# Patient Record
Sex: FEMALE | Race: OTHER | Employment: UNEMPLOYED | URBAN - METROPOLITAN AREA
[De-identification: names, ages, dates, MRNs, and addresses within clinical notes are randomized per-mention and may not be internally consistent; named-entity substitution may affect disease eponyms.]

---

## 2018-12-13 ENCOUNTER — APPOINTMENT (OUTPATIENT)
Dept: GENERAL RADIOLOGY | Age: 43
End: 2018-12-13
Attending: EMERGENCY MEDICINE
Payer: SELF-PAY

## 2018-12-13 ENCOUNTER — HOSPITAL ENCOUNTER (EMERGENCY)
Age: 43
Discharge: HOME | End: 2018-12-13
Attending: EMERGENCY MEDICINE
Payer: SELF-PAY

## 2018-12-13 VITALS
HEART RATE: 62 BPM | SYSTOLIC BLOOD PRESSURE: 119 MMHG | BODY MASS INDEX: 41.66 KG/M2 | OXYGEN SATURATION: 99 % | WEIGHT: 180 LBS | RESPIRATION RATE: 14 BRPM | TEMPERATURE: 97.5 F | HEIGHT: 55 IN | DIASTOLIC BLOOD PRESSURE: 81 MMHG

## 2018-12-13 DIAGNOSIS — M25.511 ACUTE PAIN OF RIGHT SHOULDER: Primary | ICD-10-CM

## 2018-12-13 DIAGNOSIS — R03.0 ELEVATED BLOOD PRESSURE READING: ICD-10-CM

## 2018-12-13 PROCEDURE — A4565 SLINGS: HCPCS

## 2018-12-13 PROCEDURE — 74011250637 HC RX REV CODE- 250/637: Performed by: EMERGENCY MEDICINE

## 2018-12-13 PROCEDURE — 73030 X-RAY EXAM OF SHOULDER: CPT

## 2018-12-13 PROCEDURE — 99283 EMERGENCY DEPT VISIT LOW MDM: CPT

## 2018-12-13 RX ORDER — NAPROXEN 375 MG/1
375 TABLET ORAL 2 TIMES DAILY WITH MEALS
Qty: 14 TAB | Refills: 0 | Status: SHIPPED | OUTPATIENT
Start: 2018-12-13

## 2018-12-13 RX ORDER — NAPROXEN 250 MG/1
375 TABLET ORAL
Status: COMPLETED | OUTPATIENT
Start: 2018-12-13 | End: 2018-12-13

## 2018-12-13 RX ORDER — NAPROXEN 250 MG/1
375 TABLET ORAL 2 TIMES DAILY WITH MEALS
Status: DISCONTINUED | OUTPATIENT
Start: 2018-12-13 | End: 2018-12-13

## 2018-12-13 RX ADMIN — NAPROXEN 375 MG: 250 TABLET ORAL at 12:42

## 2018-12-13 NOTE — ED NOTES
Sling applied to right arm. Patient has received discharge paperwork and instructions. Pt verbalized understanding and has no further questions or concerns at this time.

## 2018-12-13 NOTE — ED PROVIDER NOTES
43 y.o. female with no significant past medical history who presents with chief complaint of shoulder pain. Pt complains of right shoulder pain for 4 days with assocaited numbness after moving furniture in her house. Pt states the pain is rated at 8/10 and is exacerbated with movement. Pt states she has taken ibuprofen and tylenol with no relief. Pt denies fall, SOB, or chest pain. There are no other acute medical concerns at this time. Social hx: Unknown. PCP: No primary care provider on file. Note written by Chely Arevalo. Janey Mann, as dictated by Cori Lancaster MD 11:41 AM                 No past medical history on file. No past surgical history on file. No family history on file. Social History     Socioeconomic History    Marital status: Not on file     Spouse name: Not on file    Number of children: Not on file    Years of education: Not on file    Highest education level: Not on file   Social Needs    Financial resource strain: Not on file    Food insecurity - worry: Not on file    Food insecurity - inability: Not on file    Transportation needs - medical: Not on file   [employer] needs - non-medical: Not on file   Occupational History    Not on file   Tobacco Use    Smoking status: Not on file   Substance and Sexual Activity    Alcohol use: Not on file    Drug use: Not on file    Sexual activity: Not on file   Other Topics Concern    Not on file   Social History Narrative    Not on file         ALLERGIES: Patient has no known allergies. Review of Systems   Constitutional: Negative for fever. Respiratory: Negative for shortness of breath. Cardiovascular: Negative for chest pain. Gastrointestinal: Negative for abdominal pain, diarrhea, nausea and vomiting. Musculoskeletal: Positive for arthralgias and myalgias. Neurological: Positive for numbness. All other systems reviewed and are negative.       Vitals:    12/13/18 1142   BP: (!) 181/91   Pulse: 75   Resp: 14   Temp: 97.5 °F (36.4 °C)   SpO2: 100%   Weight: 81.6 kg (180 lb)   Height: 4' 5\" (1.346 m)            Physical Exam   Constitutional: She is oriented to person, place, and time. She appears well-developed and well-nourished. HENT:   Head: Normocephalic and atraumatic. Eyes: Conjunctivae are normal.   Neck: Normal range of motion. Cardiovascular: Normal rate, regular rhythm and normal heart sounds. Pulmonary/Chest: Effort normal and breath sounds normal.   Abdominal: Soft. Bowel sounds are normal. She exhibits no distension. There is no tenderness. Musculoskeletal: Normal range of motion. She exhibits tenderness. + ttp right AC joint; FROM but pain worse with movement; FROM elbow and wrist- palpable radial pulse; sensation and motor intact    Neurological: She is alert and oriented to person, place, and time. Skin: Skin is warm and dry. Nursing note and vitals reviewed. MDM  Number of Diagnoses or Management Options  Acute pain of right shoulder:   Elevated blood pressure reading:   Diagnosis management comments: Suspect rotator cuff or ac separation but will get xray for fx, nsaids and reassess       Amount and/or Complexity of Data Reviewed  Tests in the radiology section of CPT®: ordered and reviewed  Obtain history from someone other than the patient: yes (family)    Patient Progress  Patient progress: stable         Procedures      Patient's results have been reviewed with them. Patient and/or family have verbally conveyed their understanding and agreement of the patient's signs, symptoms, diagnosis, treatment and prognosis and additionally agree to follow up as recommended or return to the Emergency Room should their condition change prior to follow-up.   Discharge instructions have also been provided to the patient with some educational information regarding their diagnosis as well a list of reasons why they would want to return to the ER prior to their follow-up appointment should their condition change.

## 2018-12-13 NOTE — DISCHARGE INSTRUCTIONS
We hope that we have addressed all of your medical concerns. The examination and treatment you received in the Emergency Department were for an emergent problem and were not intended as complete care. It is important that you follow up with your healthcare provider(s) for ongoing care. If your symptoms worsen or do not improve as expected, and you are unable to reach your usual health care provider(s), you should return to the Emergency Department. Today's healthcare is undergoing tremendous change, and patient satisfaction surveys are one of the many tools to assess the quality of medical care. You may receive a survey from the [employer] regarding your experience in the Emergency Department. I hope that your experience has been completely positive, particularly the medical care that I provided. As such, please participate in the survey; anything less than excellent does not meet my expectations or intentions. Formerly [facility] and [address] [place] [address] participate in nationally recognized quality of care measures. If your blood pressure is greater than 120/80, as reported below, we urge that you seek medical care to address the potential of high blood pressure, commonly known as hypertension. Hypertension can be hereditary or can be caused by certain medical conditions, pain, stress, or \"white coat syndrome. \"       Please make an appointment with your health care provider(s) for follow up of your Emergency Department visit. VITALS:   Patient Vitals for the past 8 hrs:   Temp Pulse Resp BP SpO2   12/13/18 1142 97.5 °F (36.4 °C) 75 14 (!) 181/91 100 %          Thank you for allowing us to provide you with medical care today. We realize that you have many choices for your emergency care needs. Please choose us in the future for any continued health care needs.       Ritu Garner MD    [facility], [address] [facility]. Office: [phone]            No results found for this or any previous visit (from the past 24 hour(s)). Xr Shoulder Rt Ap/lat Min 2 V    Result Date: 12/13/2018  EXAM: XR SHOULDER RT AP/LAT MIN 2 V INDICATION: Right shoulder pain after heavy lifting moving  recently. COMPARISON: None. FINDINGS: Three views of the right shoulder demonstrate no fracture, dislocation or other acute abnormality. Joints are within normal limits. Bone mineralization is within normal limits. IMPRESSION: Normal right shoulder views. Presión arterial elevada: Instrucciones de cuidado - [ Elevated Blood Pressure: Care Instructions ]  Instrucciones de cuidado    La presión arterial es kwesi medida de la fuerza que ejerce la patricia contra las wagner de las arterias. Es normal que la presión arterial suba y baje a lo mick del día. Ray si se mantiene yulissa por un tiempo, usted tiene presión arterial yulissa. Dos números indican plasencia presión arterial. El primer número es la presión sistólica. Muestra qué tan austin presiona la patricia cuando el corazón está bombeando. El gabby número es la presión diastólica. Muestra qué tan austin presiona la [employer] latidos, cuando el corazón está relajado y llenándose de [place]. [place] N[place] arterial ideal para adultos es menos de 120/80 (diga \"120 sobre 80\"). La presión arterial yulissa es de 140/90 o superior. Usted tiene la presión arterial yulissa si el número de [place] es 140 o superior o el número de [facility]jo es 90 o superior, o ambas cosas. La prueba principal para la presión arterial yulissa es simple, rápida e indolora. Para diagnosticar presión arterial yulissa, plasencia médico examinará plasencia presión arterial en momentos diferentes. Después de tomarle la presión, es posible que plasencia médico le pida que se vuelva a victorino la presión en casa. Si se le diagnostica presión arterial yulissa, puede colaborar con plasencia médico para elaborar un plan a mick plazo para manejarla.   [address] es kwesi parte clave de plasnecia tratamiento y seguridad. Asegúrese de hacer y acudir a todas las citas, y llame a plasencia médico si está teniendo problemas. También es kwesi buena idea saber los resultados de los exámenes y mantener kwesi lista de los medicamentos que sobia. ¿Cómo puede cuidarse en el hogar? · No fume. Fumar aumenta plasencia riesgo de ataque cerebral y ataque al corazón. Si necesita ayuda para dejarlo, hable con plasencia médico sobre programas y medicamentos para dejar de fumar. Estos pueden aumentar bebeto probabilidades de dejar de fumar para siempre. · Mantenga un peso saludable. · Trate de limitar la cantidad de sodio que ingiere a menos de 2,300 miligramos (mg) al día. Plasencia médico podría pedirle que trate de ingerir menos de 1,500 mg al día. · Manténgase físicamente activo. Mike al menos 30 minutos de ejercicio la mayoría de los días de la [place]. Caminar es kwesi buena opción. Es posible que también quiera hacer otras actividades, carmelo correr, nadar, [employer], o practicar tenis o deportes de equipo. · No tome alcohol o limite la cantidad que jero. Hable con plasencia médico acerca de si puede victorino alcohol. · Coma abundantes frutas, verduras y productos lácteos bajos en grasa. Consuma menos grasa saturada y total.  · Aprenda a revisarse la presión arterial en plasencia hogar. ¿Cuándo debe pedir ayuda? Llame a plasencia médico ahora mismo o busque atención médica inmediata si:  ? · Plasencia presión arterial es mucho más yulissa de lo normal (carmelo 180/110 o superior). ? · Elisha que la presión arterial yulissa está causando síntomas carmelo:  ¨ Dolor de yunior intenso. [address]. ? Vigile muy de cerca los cambios en plasencia vi, y asegúrese de comunicarse con plasencia médico si:  ? · No mejora carmelo se esperaba. ¿Dónde puede encontrar más información en inglés? Mina perdomo http://rupinder-mee.info/. Carletha Drain [id number] en la búsqueda para aprender más acerca de \"Presión arterial elevada:  Instrucciones de cuidado - [ Elevated Blood Pressure: Care Instructions ]. \"  Revisado: 21 septiembre, 2016  Versión del contenido: 11.4  © 0290-3050 Healthwise, Incorporated. Las instrucciones de cuidado fueron adaptadas bajo licencia por [employer] (which disclaims liability or warranty for this information). Si usted tiene [place] afección médica o sobre estas instrucciones, siempre pregunte a plasencai profesional de vi. Healthwise, Incorporated niega toda garantía o responsabilidad por plasencia uso de esta información. Dolor en el hombro: Instrucciones de cuidado - [ Shoulder Pain: Care Instructions ]  Instrucciones de cuidado    Puede lesionar plasencia hombro al usarlo demasiado julio Winner, carmelo pescar o jugar béisbol. Puede suceder carmelo parte del desgaste cotidiano por el envejecimiento. Las lesiones de hombro pueden tardar tiempo en sanar, tracy plasencia hombro debería mejorar con [employer]. Plasencia médico podría recomendar un cabestrillo para descansar el hombro. Si se lesionó el hombro, penny vez necesite pruebas y [place]. La atención de seguimiento es kwesi parte clave de plasencia tratamiento y seguridad. Asegúrese de hacer y acudir a todas las citas, y llame a plasencia médico si está teniendo problemas. También es kwesi buena idea saber los resultados de bebeto exámenes y mantener kwesi lista de los medicamentos que sobia. ¿Cómo puede cuidarse en el hogar? · [employer] analgésicos (medicamentos para el dolor) exactamente según las indicaciones. ? Si el médico le recetó analgésicos, tómelos según las indicaciones. ? Si no está tomando un analgésico recetado, pregúntele a plasencia médico si puede victorino janelle de [employer]. ? No tome dos o más analgésicos al [facility], a menos que el médico se lo haya indicado. Muchos analgésicos contienen acetaminofén, es decir, Tylenol. El exceso de acetaminofén (Tylenol) puede ser dañino. · Si plasencia médico le recomienda usar un cabestrillo, úselo carmelo se le haya indicado.  No se lo quite antes de que se lo indique el médico.  · Aplíquese hielo o kwesi compresa fría sobre la cassy adolorida julio 10 a 20 minutos cada vez. Póngase un paño bruno entre el hielo y la piel. · Si no hay hinchazón, puede aplicar calor húmedo, kwesi almohadilla térmica o un paño tibio sobre el hombro. Algunos médicos sugieren alternar [place] y anil. · Descanse el hombro julio algunos días. Si plasencia médico lo recomienda, puede comenzar a ejercitar el hombro con suavidad, tracy no levante nada pesado. ¿Cuándo debe pedir ayuda? Llame al 911 en cualquier momento que considere que necesita atención de [place]. Por ejemplo, llame si:    · Siente dolor u opresión en el pecho. [place] podría ocurrir junto con:  ? Sudoración. ? Falta de aire. ? Náuseas o vómito. ? Dolor que se extiende del pecho al esequiel, la [place], o hacia janelle o ambos hombros o [id number] ? [place] Ellie. ? Pulso rápido o irregular. Después de llamar al 911, mastique 1 aspirina para adultos. Espere a la ambulancia. No trate de conducir usted mismo un automóvil.     · Plasencia brazo o mano está frío o pálido, o cambia de color.    Llame a plasencia médico ahora mismo o busque atención médica inmediata si:    · Tiene señales de infección, tales carmelo:  ? Mayor dolor, hinchazón, enrojecimiento o aumento de la temperatura en el hombro. ? Vetas rojizas que comienzan en kwesi cassy del hombro. ? Pus que supura de kwesi cassy del hombro. ? Ganglios linfáticos inflamados en el esequiel, las axilas o la raquel. ? Velta Brine especial atención a los cambios en plasencia vi y asegúrese de comunicarse con plasencia médico si:    · No puede usar el hombro.     · El hombro no mejora carmelo se esperaba. ¿Dónde puede encontrar más información en inglés? Andrea List a http://rupinder-mee.info/. Alexandrea Morales [id number] en la búsqueda para aprender más acerca de \"Dolor en el hombro: Instrucciones de cuidado - [ Shoulder Pain: Care Instructions ]. \"  Revisado: 29 noviembre, 2017  Versión del contenido: 11.8  © 9813-5075 Healthwise, [employer]. Las instrucciones de cuidado fueron adaptadas bajo licencia por [facility] (which disclaims liability or warranty for this information). Si usted tiene [place] afección médica o sobre estas instrucciones, siempre pregunte a plasencia profesional de vi. [employer] niega toda garantía o responsabilidad por plasencia uso de esta información.

## 2018-12-13 NOTE — ED TRIAGE NOTES
Rt shoulder pain after moving furniture; movement increases pain and slight numbness, took tylenol and motrin.